# Patient Record
Sex: MALE | Race: WHITE | NOT HISPANIC OR LATINO | Employment: PART TIME | URBAN - METROPOLITAN AREA
[De-identification: names, ages, dates, MRNs, and addresses within clinical notes are randomized per-mention and may not be internally consistent; named-entity substitution may affect disease eponyms.]

---

## 2020-08-06 ENCOUNTER — OFFICE VISIT (OUTPATIENT)
Dept: URGENT CARE | Facility: CLINIC | Age: 20
End: 2020-08-06
Payer: COMMERCIAL

## 2020-08-06 ENCOUNTER — APPOINTMENT (OUTPATIENT)
Dept: RADIOLOGY | Facility: CLINIC | Age: 20
End: 2020-08-06
Payer: COMMERCIAL

## 2020-08-06 VITALS
DIASTOLIC BLOOD PRESSURE: 90 MMHG | BODY MASS INDEX: 31.36 KG/M2 | HEART RATE: 69 BPM | TEMPERATURE: 98.5 F | RESPIRATION RATE: 16 BRPM | SYSTOLIC BLOOD PRESSURE: 130 MMHG | HEIGHT: 73 IN | OXYGEN SATURATION: 99 % | WEIGHT: 236.6 LBS

## 2020-08-06 DIAGNOSIS — S69.92XA INJURY, THUMB, LEFT, INITIAL ENCOUNTER: Primary | ICD-10-CM

## 2020-08-06 DIAGNOSIS — S69.92XA INJURY, THUMB, LEFT, INITIAL ENCOUNTER: ICD-10-CM
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
## 2020-08-06 PROCEDURE — 73140 X-RAY EXAM OF FINGER(S): CPT

## 2020-08-06 PROCEDURE — G0382 LEV 3 HOSP TYPE B ED VISIT: HCPCS | Performed by: PHYSICIAN ASSISTANT

## 2020-08-06 PROCEDURE — 99283 EMERGENCY DEPT VISIT LOW MDM: CPT | Performed by: PHYSICIAN ASSISTANT

## 2020-08-06 NOTE — PROGRESS NOTES
NAME: Martha Rincon is a 23 y o  male  : 2000    MRN: [id number]      Assessment and Plan   Injury, thumb, left, initial encounter [S69 [id number]]  1  Injury, thumb, left, initial encounter  XR thumb left first digit-min 2v   2  Fall, initial encounter     X-ray ordered to rule out fracture  X-ray showed no evidence of fracture  Advised patient to take OTC ibuprofen for anti-inflammatory affects  Ace wrap applied  Use ice 2 to 3 times daily  If symptoms persist the next 3-5 days Pt should follow-up with Ortho  If >102 fever, severe hand, thumb pain, N/V, SOB, CP go to ER  Patient understands and agrees with treatment plans  Jelani Lozano was seen today for thumb injury  Diagnoses and all orders for this visit:    Injury, thumb, left, initial encounter  -     XR thumb left first digit-min 2v; Future    Fall, initial encounter        Patient Instructions   There are no Patient Instructions on file for this visit  Proceed to ER if symptoms worsen  Chief Complaint     Chief Complaint   Patient presents with    Thumb Injury     left, yesterday pt fell landing on thumb bending thumb into palm; c/o pain in base of thumb          History of Present Illness     20yo Pt presents with left thumb injury status post fall x 1 day  Patient reports falling yesterday landing on his left hand with thumb bent  Patient rates pain left thumb 5/10  Has used ice   Has  taken OTC Ibuprofen  Denies ecchymoses, swelling, open wound, bleeding  Denies numbness, tingling, weakness  Denies pain in wrists, elbow, shoulder  Denies head injury or LOC  Denies other injuries from the incident  Denies previous injuries to  Hand  Review of Systems   Review of Systems   Constitutional: Negative  Respiratory: Negative  Cardiovascular: Negative  Musculoskeletal: Positive for myalgias (Left thumb)  Current Medications     No current outpatient medications on file      Current Allergies     Allergies as of 08/06/2020    (No Known Allergies)              Past Medical History:   Diagnosis Date    Asthma     childhood       Past Surgical History:   Procedure Laterality Date    MOUTH SURGERY         No family history on file  Medications have been verified  The following portions of the patient's history were reviewed and updated as appropriate: allergies, current medications, past family history, past medical history, past social history, past surgical history and problem list     Objective   /90   Pulse 69   Temp 98 5 °F (36 9 °C) (Tympanic)   Resp 16   Ht 6' 1" (1 854 m)   Wt 107 kg (236 lb 9 6 oz)   SpO2 99%   BMI 31 22 kg/m²      Physical Exam     Physical Exam  Vitals signs and nursing note reviewed  Constitutional:       Appearance: Normal appearance  Cardiovascular:      Rate and Rhythm: Normal rate and regular rhythm  Heart sounds: Normal heart sounds  No murmur  No friction rub  No gallop  Pulmonary:      Effort: Pulmonary effort is normal  No respiratory distress  Breath sounds: Normal breath sounds  No stridor  No wheezing, rhonchi or rales  Chest:      Chest wall: No tenderness  Musculoskeletal:      Left hand: He exhibits tenderness (Left thumb)  He exhibits normal range of motion, no bony tenderness, normal two-point discrimination, normal capillary refill, no deformity, no laceration and no swelling  Normal sensation noted  Normal strength noted          Hands:          Sarah Borges PA-C

## 2021-07-29 ENCOUNTER — TELEPHONE (OUTPATIENT)
Dept: OBGYN CLINIC | Facility: HOSPITAL | Age: 21
End: 2021-07-29

## 2021-07-29 ENCOUNTER — HOSPITAL ENCOUNTER (OUTPATIENT)
Dept: RADIOLOGY | Facility: HOSPITAL | Age: 21
Discharge: HOME | End: 2021-07-29
Payer: COMMERCIAL

## 2021-07-29 ENCOUNTER — OFFICE VISIT (OUTPATIENT)
Dept: OBGYN CLINIC | Facility: HOSPITAL | Age: 21
End: 2021-07-29
Payer: COMMERCIAL

## 2021-07-29 DIAGNOSIS — M25.571 PAIN IN JOINT INVOLVING RIGHT ANKLE AND FOOT: ICD-10-CM

## 2021-07-29 DIAGNOSIS — S93.602A SPRAIN OF LEFT FOOT, INITIAL ENCOUNTER: ICD-10-CM

## 2021-07-29 DIAGNOSIS — M25.572 LEFT ANKLE PAIN, UNSPECIFIED CHRONICITY: ICD-10-CM

## 2021-07-29 DIAGNOSIS — M25.572 LEFT ANKLE PAIN, UNSPECIFIED CHRONICITY: Primary | ICD-10-CM

## 2021-07-29 PROCEDURE — 99203 OFFICE O/P NEW LOW 30 MIN: CPT | Performed by: PHYSICIAN ASSISTANT

## 2021-07-29 PROCEDURE — 73610 X-RAY EXAM OF ANKLE: CPT

## 2021-07-29 PROCEDURE — 73630 X-RAY EXAM OF FOOT: CPT

## 2021-07-29 NOTE — PROGRESS NOTES
Assessment/Plan   Diagnoses and all orders for this visit:      Pain in joint involving right ankle and foot  Midfoot sprain  - Concern for lisfranc fx  - CAM boot fitted and dispensed  - Ice, NSAIDs as needed  - Use the crutches until walking without a limp  - CT scan  - Follow up with Dr Abigail Cortez or Dr Joss Quinn after CT        Subjective   Patient ID: Olayinka Feliz is a 21 y o  male  Vitals:     Stacie Reddy male comes in for an evaluation of his left foot and ankle  He was injured 7-26-21 when he twisted and fell while playing [employer] (a tag-like game)  at a summer camp  He twisted, fell, and then a large lid fell on top of him  He went to an urgent care and was treated with a posterior splint  The pain is sharp in character, moderate in severity, pain does not radiate and is not associated with numbness  The pain is located in the dorsal midfoot  The following portions of the patient's history were reviewed and updated as appropriate: allergies, current medications, past family history, past medical history, past social history, past surgical history and problem list     Review of Systems  Ortho Exam  History reviewed  No pertinent past medical history  Past Surgical History:   Procedure Laterality Date    DENTAL SURGERY       History reviewed  No pertinent family history  Social History     Occupational History    Not on file   Tobacco Use    Smoking status: Never Smoker    Smokeless tobacco: Never Used   Vaping Use    Vaping Use: Never used   Substance and Sexual Activity    Alcohol use: Not on file    Drug use: Not on file    Sexual activity: Not on file       Review of Systems   Constitutional: Negative  HENT: Negative  Eyes: Negative  Respiratory: Negative  Cardiovascular: Negative  Gastrointestinal: Negative  Endocrine: Negative  Genitourinary: Negative  Musculoskeletal: As below      Allergic/Immunologic: Negative  Neurological: Negative      Hematological: Negative  Psychiatric/Behavioral: Negative  Objective   Physical Exam        I have personally reviewed pertinent films in PACS and my interpretation is concern for lisfranc injury, cuneiform fracture  · Constitutional: Awake, Alert, Oriented  · Eyes: EOMI  · Psych: Mood and affect appropriate  · Heart: regular rate and rhythm  · Lungs: No audible wheezing  · Abdomen: soft  · Lymph: no lymphedema             left ankle, foot:  - Appearance   Swelling: mild, no discoloration, no deformity, no ecchymosis and no erythema  - Palpation   + tenderness over the dorsal midfoot    Non-tender malleoli, achilles, proximal tib/fib, forefoot   - ROM   No active motion in the foot / ankle due to pain  - Special Tests      - Motor   limited by pain  - NVI distally

## 2021-07-29 NOTE — TELEPHONE ENCOUNTER
Patient's mom is calling stating that this is not [facility] for you & it is the regular [facility]   Patient's mom works at [employer]

## 2021-07-29 NOTE — TELEPHONE ENCOUNTER
LM giving patient directions to our office and asking what [facility] he has  If he has [address] we are not par with this  Any other [facility] should be OK

## 2021-08-03 ENCOUNTER — HOSPITAL ENCOUNTER (OUTPATIENT)
Dept: CT IMAGING | Facility: HOSPITAL | Age: 21
Discharge: HOME | End: 2021-08-03
Payer: COMMERCIAL

## 2021-08-03 DIAGNOSIS — M25.571 PAIN IN JOINT INVOLVING RIGHT ANKLE AND FOOT: ICD-10-CM

## 2021-08-03 DIAGNOSIS — M25.572 LEFT ANKLE PAIN, UNSPECIFIED CHRONICITY: ICD-10-CM

## 2021-08-03 PROCEDURE — 73700 CT LOWER EXTREMITY W/O DYE: CPT

## 2021-08-05 ENCOUNTER — OFFICE VISIT (OUTPATIENT)
Dept: OBGYN CLINIC | Facility: CLINIC | Age: 21
End: 2021-08-05
Payer: COMMERCIAL

## 2021-08-05 VITALS
WEIGHT: 235 LBS | DIASTOLIC BLOOD PRESSURE: 68 MMHG | SYSTOLIC BLOOD PRESSURE: 112 MMHG | BODY MASS INDEX: 31.14 KG/M2 | HEIGHT: 73 IN

## 2021-08-05 DIAGNOSIS — S93.325A LISFRANC DISLOCATION, LEFT, INITIAL ENCOUNTER: Primary | ICD-10-CM

## 2021-08-05 DIAGNOSIS — S93.602A SPRAIN OF LEFT FOOT, INITIAL ENCOUNTER: ICD-10-CM

## 2021-08-05 PROCEDURE — 99213 OFFICE O/P EST LOW 20 MIN: CPT | Performed by: ORTHOPAEDIC SURGERY

## 2021-08-05 NOTE — PATIENT INSTRUCTIONS
Lisfranc Injury     What is a Lisfranc injury? A Lisfranc injury involves the joints and/or the ligaments of the middle of the foot  This injury can result from a major accident or a simple slip and fall  Sometimes this injury can be mistaken for a sprain, and not obtaining treatment can sometimes lead to more significant problems  The degree of injury can range from mild to severe  What are the symptoms of a Lisfranc injury? The common symptoms of a Lisfranc injury are swelling and pain on the top of the foot  Bruising is common, and bruising on the bottom of the foot is a clue that this injury has occurred  With a severe injury, the foot may be distorted and putting any weight on it may be very painful  With a mild injury, the foot may appear normal and you may be able to put weight on it with only mild pain  What causes a Lisfranc injury? This injury may be low-energy and result from a slip and fall  Twisting frequently occurs during athletic injuries but can also occur from a misstep or even missing a stair and stumbling over the top of the foot  High-energy injuries occur from direct trauma such as a car accident or a fall from a height  ?   Anatomy   The midfoot area is comprised of the joints and ligaments in the middle of the foot  Specialized ligaments hold the bones in this area together like puzzle pieces to maintain the arch of the foot  When the ligaments and/or bones in this area are injured, they may shift out of place    How is a Lisfranc injury diagnosed? The diagnosis of a Lisfranc injury is made using several things: your symptoms, a foot exam, X-rays and other tests   Your orthopaedic foot and ankle specialist will examine the middle part of your foot to identify the location of pain and perform tests to check the stability of this area  X-rays may show broken and or shifted bones in the middle of the foot  Sometimes X-rays will be taken while you are standing in order to better identify the shifting of bones in the foot  An MRI scan may be helpful to see if the ligaments in the foot are damaged  A CT scan can help determine the extent of the bone injury and is useful when planning surgery    What are treatment options? If the ligaments and the bones in the middle of the foot are not severely injured, and bones are not shifted out of their normal positions, non-surgical treatment can be successful with casting  A cast may be needed for six weeks  Your orthopaedic foot and ankle specialist will follow up regularly with X-rays to make sure the bones maintain their good position during the recovery  If the bones or ligaments are injured in a way that causes them to shift out of their normal positions, Lisfranc surgery may be necessary to restore the anatomy of the foot  Surgery may involve the placement of plates and screws that may be removed later, once the bones and ligaments have healed  How long is recovery after surgery? Recovery from Lisfranc surgery depends in part on the severity of the injury  For most cases of surgery, patients will be in a cast and not be able to put weight on the foot for six weeks, followed by six weeks in a walking boot  Physical therapy may be needed to strengthen the foot and ankle and help regain walking ability  Return to maximal function, running and sports can take about one year    Potential Complications    ? Lisfranc injuries may result in arthritis and chronic pain in the middle of the foot  This may require additional treatment  With surgery, injury to the nerves and tendons may occur  Because of the swelling that often occurs with this injury, complications such as wound opening, infection and/or further swelling of the foot may occur after surgery  ?  The outcome for Lisfranc injuries depends on their severity   Some patients will not be able to return to their pre-injury level of functioning or athletics even with well-performed treatment  The cartilage joint surfaces are commonly injured and some patients may develop arthritis of the middle of the foot  It is also common for pain to continue in the joints after this injury  For some patients, surgery such a fusion of the joints may be necessary to relieve arthritis pain      Frequently Asked Questions    How can I tell if it's a sprain or a Lisfranc injury? Unrecognized and untreated Lisfranc injuries can have serious complications, including joint degeneration and a buildup of pressure within muscles that can damage nerves and blood vessels  If the standard treatment for a sprain (rest, ice and elevation) doesn't reduce the pain and swelling within a day or two, or there is extensive bruising on the bottom of the foot, see your doctor immediately and ask for a referral to an orthopaedic foot and ankle specialist     How soon can I get back to normal activity? It is important to follow your doctor's orders and refrain from activities until you are given the go-ahead  If you return to activities too soon after a Lisfranc injury or surgery, you may suffer another injury that results in damage to blood vessels, development of arthritis and/or an even longer healing time

## 2021-08-05 NOTE — PROGRESS NOTES
YELENA Felipe  Attending, Orthopaedic Surgery  [facility] [address] [facility]      ORTHOPAEDIC FOOT AND ANKLE CLINIC VISIT     Assessment:     Encounter Diagnoses   Name Primary?  Sprain of left foot, initial encounter     Lisfranc dislocation, left, initial encounter Yes            Plan:   · The patient verbalized understanding of exam findings and treatment plan  We engaged in the shared decision-making process and treatment options were discussed at length with the patient  Surgical and conservative management discussed today along with risks and benefits  · He has pain right over his lisfranc ligament complex  A CT scan was obtained although MRI is 100% sensitive for ligamentus injury and would have been the imaging study of choice in this situation  · MRI left foot ordered today STAT  See back in 1 week for review of MRI      History of Present Illness:   Chief Complaint: left foot pain     Jodie Ren is a 21 y o  male who is being seen for left foot pain  Patient was seen in urgent care on 7/29/21, after a twisting injury on 7/26/21 and fell while playing sharks and minnows at a summer camp  He had a year old fall directly onto his foot after her fall  He was treated with a posterior splint and crutches  CT scan was ordered to rule out possible lisfranc injury and placed in a cam boot  Pain is localized at lisfranc region with minimal radiating and described as sharp and severe  Patient denies numbness, tingling or radicular pain  Denies history of neuropathy  Patient does not smoke, does not have diabetes and does notno take blood thinners  Patient denies family history of anesthesia complications and has not had any complications with anesthesia       Pain/symptom timing:  Worse during the day when active  Pain/symptom context:  Worse with activites and work  Pain/symptom modifying factors:  Rest makes better, activities make worse  Pain/symptom associated signs/symptoms: none    Prior treatment   · NSAIDsYes   · Injections No   · Bracing/Orthotics Yes  Posterior splint and cam boot  · Physical Therapy No     Orthopedic Surgical History:   See below    Past Medical, Surgical and Social History:  Past Medical History:  has a past medical history of Asthma  Problem List: does not have a problem list on file  Past Surgical History:  has a past surgical history that includes Mouth surgery and Dental surgery  Family History: family history is not on file  Social History:  reports that he has never smoked  He has never used smokeless tobacco  He reports that he does not drink alcohol and does not use drugs  Current Medications: currently has no medications in their medication list   Allergies: has No Known Allergies  Review of Systems:  General- denies fever/chills  HEENT- denies hearing loss or sore throat  Eyes- denies eye pain or visual disturbances, denies red eyes  Respiratory- denies cough or SOB  Cardio- denies chest pain or palpitations  GI- denies abdominal pain  Endocrine- denies urinary frequency  Urinary- denies pain with urination  Musculoskeletal- Negative except noted above  Skin- denies rashes or wounds  Neurological- denies dizziness or headache  Psychiatric- denies anxiety or difficulty concentrating    Physical Exam:   There were no vitals taken for this visit  General/Constitutional: No apparent distress: well-nourished and well developed  Eyes: normal ocular motion  Cardio: RRR, Normal S1S2, No m/r/g  Lymphatic: No appreciable lymphadenopathy  Respiratory: Non-labored breathing, CTA b/l no w/c/r  Vascular: No edema, swelling or tenderness, except as noted in detailed exam   Integumentary: No impressive skin lesions present, except as noted in detailed exam   Neuro: No ataxia or tremors noted  Psych: Normal mood and affect, oriented to person, place and time  Appropriate affect    Musculoskeletal: Normal, except as noted in detailed exam and in HPI     Examination    Left    Gait Antalgic   Musculoskeletal Tender to palpation at lisfranc region    Skin Normal       Nails Normal    Range of Motion  10 degrees dorsiflexion, 30 degrees plantarflexion  Subtalar motion: normal    Stability Stable    Muscle Strength 5/5 tibialis anterior  5/5 gastrocnemius-soleus  5/5 posterior tibialis  5/5 peroneal/eversion strength  5/5 EHL  5/5 FHL    Neurologic Normal    Sensation Intact to light touch throughout sural, saphenous, superficial peroneal, deep peroneal and medial/lateral plantar nerve distributions  [place] 5 07 filament (10g) testing  deferred  Cardiovascular Brisk capillary refill < 2 seconds,intact DP and PT pulses    Special Tests None      Imaging Studies:   CT scan of the left lower extremity was obtained, which demonstrates no evidence of fracture at the region of the lisfranc ligaments  Reviewed by me personally  Soyla Hoit Lachman, MD  Foot & Ankle Surgery   Department of [address] [place] [address]      I personally performed the service  Soyla Hoit Lachman, MD    Scribe Attestation    I,:   am acting as a scribe while in the presence of the attending physician :       I,:   personally performed the services described in this documentation    as scribed in my presence :

## 2021-08-07 ENCOUNTER — HOSPITAL ENCOUNTER (OUTPATIENT)
Dept: MRI IMAGING | Facility: HOSPITAL | Age: 21
Discharge: HOME | End: 2021-08-07
Attending: ORTHOPAEDIC SURGERY
Payer: COMMERCIAL

## 2021-08-07 DIAGNOSIS — S93.325A LISFRANC DISLOCATION, LEFT, INITIAL ENCOUNTER: ICD-10-CM

## 2021-08-07 PROCEDURE — 73718 MRI LOWER EXTREMITY W/O DYE: CPT

## 2021-08-09 ENCOUNTER — TELEPHONE (OUTPATIENT)
Dept: OBGYN CLINIC | Facility: HOSPITAL | Age: 21
End: 2021-08-09

## 2021-08-09 NOTE — TELEPHONE ENCOUNTER
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

## 2021-08-11 NOTE — TELEPHONE ENCOUNTER
Called and spoke to mother  It does not look like he will need surgery based on MRI but Dr Stevo Nicolas will discuss treatment moving forward at his follow up appointment tomorrow

## 2021-08-12 ENCOUNTER — OFFICE VISIT (OUTPATIENT)
Dept: OBGYN CLINIC | Facility: CLINIC | Age: 21
End: 2021-08-12
Payer: COMMERCIAL

## 2021-08-12 VITALS
DIASTOLIC BLOOD PRESSURE: 62 MMHG | WEIGHT: 235 LBS | HEIGHT: 73 IN | BODY MASS INDEX: 31.14 KG/M2 | SYSTOLIC BLOOD PRESSURE: 117 MMHG

## 2021-08-12 DIAGNOSIS — S93.602A SPRAIN OF LEFT FOOT, INITIAL ENCOUNTER: Primary | ICD-10-CM

## 2021-08-12 PROCEDURE — 99213 OFFICE O/P EST LOW 20 MIN: CPT | Performed by: ORTHOPAEDIC SURGERY

## 2021-08-12 NOTE — PROGRESS NOTES
YELENA Waite  Attending, Orthopaedic Surgery  [facility] [address] [facility]      ORTHOPAEDIC FOOT AND ANKLE CLINIC VISIT     Assessment:     Encounter Diagnosis   Name Primary?  Sprain of left foot, initial encounter Yes            Plan:   · The patient verbalized understanding of exam findings and treatment plan  We engaged in the shared decision-making process and treatment options were discussed at length with the patient  Surgical and conservative management discussed today along with risks and benefits  · MRI demonstrates sprain of the inner osseus membrane  No evidence of Lisfranc injury  · Wean the boot protocol was provided to begin today  · PT script was provided  · Continue to ice and elevated as needed  · If he has no pain, then he may cancel the appointment  History of Present Illness:   Chief Complaint: left foot follow up     Micheline Eagle is a 21 y o  male who is being seen in follow-up for left foot pain  When we last saw he we recommended a STAT MRI to rule out Lisfranc injury  Advised to remain in the cam boot and WBAT  Pain has  improved  Residual pain is localized at lisfranc with minimal radiating and described as sharp and severe  Pain/symptom timing:  Worse during the day when active  Pain/symptom context:  Worse with activites and work  Pain/symptom modifying factors:  Rest makes better, activities make worse  Pain/symptom associated signs/symptoms: none    Prior treatment   · NSAIDsYes   · Injections No   · Bracing/Orthotics Yes    · Physical Therapy No     Orthopedic Surgical History:   See Below    Past Medical, Surgical and Social History:  Past Medical History:  has a past medical history of Asthma  Problem List: does not have a problem list on file  Past Surgical History:  has a past surgical history that includes Mouth surgery and Dental surgery  Family History: family history is not on file    Social History:  reports that he has never smoked  He has never used smokeless tobacco  He reports that he does not drink alcohol and does not use drugs  Current Medications: currently has no medications in their medication list   Allergies: has No Known Allergies  Review of Systems:  General- denies fever/chills  HEENT- denies hearing loss or sore throat  Eyes- denies eye pain or visual disturbances, denies red eyes  Respiratory- denies cough or SOB  Cardio- denies chest pain or palpitations  GI- denies abdominal pain  Endocrine- denies urinary frequency  Urinary- denies pain with urination  Musculoskeletal- Negative except noted above  Skin- denies rashes or wounds  Neurological- denies dizziness or headache  Psychiatric- denies anxiety or difficulty concentrating    Physical Exam:   /62   Ht 6' 1" (1 854 m)   Wt 107 kg (235 lb)   BMI 31 00 kg/m²   General/Constitutional: No apparent distress: well-nourished and well developed  Eyes: normal ocular motion  Lymphatic: No appreciable lymphadenopathy  Respiratory: Non-labored breathing  Vascular: No edema, swelling or tenderness, except as noted in detailed exam   Integumentary: No impressive skin lesions present, except as noted in detailed exam   Neuro: No ataxia or tremors noted  Psych: Normal mood and affect, oriented to person, place and time  Appropriate affect  Musculoskeletal: Normal, except as noted in detailed exam and in HPI      Examination    Left    Gait Normal   Musculoskeletal Tender to palpation at lisfrance    Skin Normal      Nails Normal    Range of Motion  Unable to assess degrees dorsiflexion, unable to assess degrees plantarflexion  Subtalar motion: unable to assess    Stability Stable    Muscle Strength 5/5 tibialis anterior  5/5 gastrocnemius-soleus  5/5 posterior tibialis  5/5 peroneal/eversion strength  5/5 EHL  5/5 FHL    Neurologic Normal    Sensation  Intact to light touch throughout sural, saphenous, superficial peroneal, deep peroneal and medial/lateral plantar nerve distributions  [place] 5 07 filament (10g) testing  deferred  Cardiovascular Brisk capillary refill < 2 seconds,intact DP and PT pulses    Special Tests None      Imaging Studies:   MRI available for review of left foot which demonstrates no lisfranc compromise  Intraosseus component sprain  Reviewed by me personally  Esmeralda Distel Lachman, MD  Foot & Ankle Surgery   Department of [address] [place] [address]      I personally performed the service  Esmeralda Distel Lachman, MD    Scribe Attestation    I,:   am acting as a scribe while in the presence of the attending physician :       I,:   personally performed the services described in this documentation    as scribed in my presence :

## 2021-10-15 ENCOUNTER — OFFICE VISIT (OUTPATIENT)
Dept: URGENT CARE | Facility: CLINIC | Age: 21
End: 2021-10-15
Payer: COMMERCIAL

## 2021-10-15 ENCOUNTER — APPOINTMENT (OUTPATIENT)
Dept: RADIOLOGY | Facility: CLINIC | Age: 21
End: 2021-10-15
Payer: COMMERCIAL

## 2021-10-15 VITALS
BODY MASS INDEX: 38.7 KG/M2 | HEIGHT: 73 IN | OXYGEN SATURATION: 97 % | SYSTOLIC BLOOD PRESSURE: 137 MMHG | HEART RATE: 87 BPM | RESPIRATION RATE: 20 BRPM | DIASTOLIC BLOOD PRESSURE: 86 MMHG | WEIGHT: 292 LBS | TEMPERATURE: 96.1 F

## 2021-10-15 DIAGNOSIS — S93.492A SPRAIN OF ANTERIOR TALOFIBULAR LIGAMENT OF LEFT ANKLE, INITIAL ENCOUNTER: Primary | ICD-10-CM

## 2021-10-15 DIAGNOSIS — M25.572 ACUTE LEFT ANKLE PAIN: ICD-10-CM

## 2021-10-15 PROCEDURE — 73610 X-RAY EXAM OF ANKLE: CPT

## 2021-10-15 PROCEDURE — 99213 OFFICE O/P EST LOW 20 MIN: CPT | Performed by: FAMILY MEDICINE

## 2021-10-15 PROCEDURE — S9083 URGENT CARE CENTER GLOBAL: HCPCS | Performed by: FAMILY MEDICINE

## 2021-10-15 RX ORDER — MELOXICAM 7.5 MG/1
7.5 TABLET ORAL DAILY
Qty: 60 TABLET | Refills: 0 | Status: SHIPPED | OUTPATIENT
Start: 2021-10-15

## 2022-03-27 ENCOUNTER — APPOINTMENT (OUTPATIENT)
Dept: RADIOLOGY | Facility: CLINIC | Age: 22
End: 2022-03-27
Payer: COMMERCIAL

## 2022-03-27 ENCOUNTER — OFFICE VISIT (OUTPATIENT)
Dept: URGENT CARE | Facility: CLINIC | Age: 22
End: 2022-03-27
Payer: COMMERCIAL

## 2022-03-27 VITALS
TEMPERATURE: 97.4 F | WEIGHT: 290 LBS | HEART RATE: 86 BPM | HEIGHT: 73 IN | BODY MASS INDEX: 38.43 KG/M2 | OXYGEN SATURATION: 100 % | DIASTOLIC BLOOD PRESSURE: 92 MMHG | RESPIRATION RATE: 18 BRPM | SYSTOLIC BLOOD PRESSURE: 137 MMHG

## 2022-03-27 DIAGNOSIS — S93.402A SPRAIN OF LEFT ANKLE, UNSPECIFIED LIGAMENT, INITIAL ENCOUNTER: Primary | ICD-10-CM

## 2022-03-27 DIAGNOSIS — S99.912A INJURY OF LEFT ANKLE, INITIAL ENCOUNTER: ICD-10-CM

## 2022-03-27 PROCEDURE — S9083 URGENT CARE CENTER GLOBAL: HCPCS | Performed by: PHYSICIAN ASSISTANT

## 2022-03-27 PROCEDURE — 73610 X-RAY EXAM OF ANKLE: CPT

## 2022-03-27 PROCEDURE — 99213 OFFICE O/P EST LOW 20 MIN: CPT | Performed by: PHYSICIAN ASSISTANT

## 2022-03-27 RX ORDER — IBUPROFEN 600 MG/1
600 TABLET ORAL EVERY 6 HOURS PRN
Qty: 30 TABLET | Refills: 0 | Status: SHIPPED | OUTPATIENT
Start: 2022-03-27

## 2022-03-27 NOTE — PATIENT INSTRUCTIONS
Ankle Sprain   WHAT YOU NEED TO KNOW:   An ankle sprain happens when 1 or more ligaments in your ankle joint stretch or tear  Ligaments are tough tissues that connect bones  Ligaments support your joints and keep your bones in place  DISCHARGE INSTRUCTIONS:   Return to the emergency department if:   · You have severe pain in your ankle  · Your foot or toes are cold or numb  · Your ankle becomes more weak or unstable (wobbly)  · You are unable to put any weight on your ankle or foot  · Your swelling has increased or returned  Call your doctor if:   · Your pain does not go away, even after treatment  · You have questions or concerns about your condition or care  Medicines: You may need any of the following:  · NSAIDs , such as ibuprofen, help decrease swelling, pain, and fever  This medicine is available with or without a doctor's order  NSAIDs can cause stomach bleeding or kidney problems in certain people  If you take blood thinner medicine, always ask your healthcare provider if NSAIDs are safe for you  Always read the medicine label and follow directions  · Acetaminophen  decreases pain and fever  It is available without a doctor's order  Ask how much to take and how often to take it  Follow directions  Read the labels of all other medicines you are using to see if they also contain acetaminophen, or ask your doctor or pharmacist  Acetaminophen can cause liver damage if not taken correctly  Do not use more than 4 grams (4,000 milligrams) total of acetaminophen in one day  · Prescription pain medicine  may be given  Ask your healthcare provider how to take this medicine safely  Some prescription pain medicines contain acetaminophen  Do not take other medicines that contain acetaminophen without talking to your healthcare provider  Too much acetaminophen may cause liver damage  Prescription pain medicine may cause constipation   Ask your healthcare provider how to prevent or treat constipation  · Take your medicine as directed  Contact your healthcare provider if you think your medicine is not helping or if you have side effects  Tell him or her if you are allergic to any medicine  Keep a list of the medicines, vitamins, and herbs you take  Include the amounts, and when and why you take them  Bring the list or the pill bottles to follow-up visits  Carry your medicine list with you in case of an emergency  Self-care:   · Use support devices,  such as a brace, cast, or splint, to limit your movement and protect your joint  You may need to use crutches to decrease your pain as you move around  · Go to physical therapy as directed  A physical therapist teaches you exercises to help improve movement and strength, and to decrease pain  · Rest  your ankle so that it can heal  Return to normal activities as directed  · Apply ice  on your ankle for 15 to 20 minutes every hour or as directed  Use an ice pack, or put crushed ice in a plastic bag  Cover it with a towel  Ice helps prevent tissue damage and decreases swelling and pain  · Compress  your ankle  Ask if you should wrap an elastic bandage around your injured ligament  An elastic bandage provides support and helps decrease swelling and movement so your joint can heal  Wear as long as directed  · Elevate  your ankle above the level of your heart as often as you can  This will help decrease swelling and pain  Prop your ankle on pillows or blankets to keep it elevated comfortably  Prevent another ankle sprain:   · Let your ankle heal   Find out how long your ligament needs to heal  Do not do any physical activity until your healthcare provider says it is okay  If you start activity too soon, you may develop a more serious injury  · Always warm up and stretch  before you exercise or play sports  · Use the right equipment  Always wear shoes that fit well and are made for the activity that you are doing   You may also need ankle supports, elbow and knee pads, or braces  Follow up with your doctor as directed:  Write down your questions so you remember to ask them during your visits  © Copyright [employer] 2022 Information is for End User's use only and may not be sold, redistributed or otherwise used for commercial purposes  All illustrations and images included in CareNotes® are the copyrighted property of A D A M [employer] Inc  or [facility] Laci [employer]   The above information is an  only  It is not intended as medical advice for individual conditions or treatments  Talk to your doctor, nurse or pharmacist before following any medical regimen to see if it is safe and effective for you

## 2022-03-27 NOTE — PROGRESS NOTES
[facility]        NAME: Mallory Altamirano is a 24 y o  male  : 2000    MRN: [id number]  DATE: 2022  TIME: 1:30 PM    /92   Pulse 86   Temp (!) 97 4 °F (36 3 °C)   Resp 18   Ht 6' 1" (1 854 m)   Wt 132 kg (290 lb)   SpO2 100%   BMI 38 26 kg/m²     Assessment and Plan   Sprain of left ankle, unspecified ligament, initial encounter [S93 402A]  1  Sprain of left ankle, unspecified ligament, initial encounter  XR ankle 3+ vw left    Compression bandages    ibuprofen (MOTRIN) 600 mg tablet    Ambulatory referral to Orthopedic Surgery         Patient Instructions       Follow up with PCP in 3-5 days  Proceed to  ER if symptoms worsen  Chief Complaint     Chief Complaint   Patient presents with    Ankle Pain     twsited left ankle 3 x 1 week ago while on a hiking trail, pain is on left lateral and medial ankle, sharp pain in nature         History of Present Illness       Pt with left ankle pain since inverting 3 x in 1 days several days ago       Review of Systems   Review of Systems   Constitutional: Negative  HENT: Negative  Eyes: Negative  Respiratory: Negative  Cardiovascular: Negative  Gastrointestinal: Negative  Endocrine: Negative  Genitourinary: Negative  Musculoskeletal: Negative  Skin: Negative  Allergic/Immunologic: Negative  Neurological: Negative  Hematological: Negative  Psychiatric/Behavioral: Negative  All other systems reviewed and are negative          Current Medications       Current Outpatient Medications:     ibuprofen (MOTRIN) 600 mg tablet, Take 1 tablet (600 mg total) by mouth every 6 (six) hours as needed for mild pain, Disp: 30 tablet, Rfl: 0    meloxicam (MOBIC) 7 5 mg tablet, Take 1 tablet (7 5 mg total) by mouth daily (Patient not taking: Reported on 3/27/2022 ), Disp: 60 tablet, Rfl: 0    Current Allergies     Allergies as of 2022    (No Known Allergies)            The following portions of the patient's history were reviewed and updated as appropriate: allergies, current medications, past family history, past medical history, past social history, past surgical history and problem list      Past Medical History:   Diagnosis Date    Asthma     childhood       Past Surgical History:   Procedure Laterality Date   [address]          History reviewed  No pertinent family history  Medications have been verified  Objective   /92   Pulse 86   Temp (!) 97 4 °F (36 3 °C)   Resp 18   Ht 6' 1" (1 854 m)   Wt 132 kg (290 lb)   SpO2 100%   BMI 38 26 kg/m²        Physical Exam     Physical Exam  Vitals and nursing note reviewed  Constitutional:       Appearance: Normal appearance  He is normal weight  HENT:      Head: Normocephalic and atraumatic  Right Ear: Tympanic membrane, ear canal and external ear normal       Left Ear: Tympanic membrane, ear canal and external ear normal       Nose: Nose normal       Mouth/Throat:      Mouth: Mucous membranes are moist       Pharynx: Oropharynx is clear  Eyes:      Extraocular Movements: Extraocular movements intact  Conjunctiva/sclera: Conjunctivae normal       Pupils: Pupils are equal, round, and reactive to light  Cardiovascular:      Rate and Rhythm: Normal rate and regular rhythm  Pulses: Normal pulses  Heart sounds: Normal heart sounds  Pulmonary:      Effort: Pulmonary effort is normal       Breath sounds: Normal breath sounds  Abdominal:      General: Abdomen is flat  Bowel sounds are normal       Palpations: Abdomen is soft  Musculoskeletal:         General: Normal range of motion  Cervical back: Normal range of motion and neck supple  Comments: Left ankle from lateral malleolus tender and swelling  No achilles tender no heel pain  No foot pain toes from dp pulse strong    Skin:     General: Skin is warm  Capillary Refill: Capillary refill takes less than 2 seconds  Neurological:      General: No focal deficit present  Mental Status: He is alert and oriented to person, place, and time     Psychiatric:         Mood and Affect: Mood normal          Behavior: Behavior normal

## 2022-04-25 ENCOUNTER — HOSPITAL ENCOUNTER (EMERGENCY)
Facility: HOSPITAL | Age: 22
Discharge: HOME | End: 2022-04-25
Attending: EMERGENCY MEDICINE | Admitting: EMERGENCY MEDICINE
Payer: COMMERCIAL

## 2022-04-25 ENCOUNTER — APPOINTMENT (EMERGENCY)
Dept: CT IMAGING | Facility: HOSPITAL | Age: 22
End: 2022-04-25
Payer: COMMERCIAL

## 2022-04-25 VITALS
TEMPERATURE: 98.5 F | RESPIRATION RATE: 20 BRPM | WEIGHT: 290 LBS | BODY MASS INDEX: 38.43 KG/M2 | SYSTOLIC BLOOD PRESSURE: 130 MMHG | HEIGHT: 73 IN | DIASTOLIC BLOOD PRESSURE: 82 MMHG | OXYGEN SATURATION: 99 % | HEART RATE: 85 BPM

## 2022-04-25 DIAGNOSIS — F07.81 POST CONCUSSIVE SYNDROME: ICD-10-CM

## 2022-04-25 DIAGNOSIS — S09.90XA HEAD INJURY: Primary | ICD-10-CM

## 2022-04-25 PROCEDURE — 70450 CT HEAD/BRAIN W/O DYE: CPT

## 2022-04-25 PROCEDURE — 99284 EMERGENCY DEPT VISIT MOD MDM: CPT | Performed by: PHYSICIAN ASSISTANT

## 2022-04-25 PROCEDURE — 96372 THER/PROPH/DIAG INJ SC/IM: CPT

## 2022-04-25 PROCEDURE — G1004 CDSM NDSC: HCPCS

## 2022-04-25 PROCEDURE — 99284 EMERGENCY DEPT VISIT MOD MDM: CPT

## 2022-04-25 RX ORDER — KETOROLAC TROMETHAMINE 30 MG/ML
15 INJECTION, SOLUTION INTRAMUSCULAR; INTRAVENOUS ONCE
Status: COMPLETED | OUTPATIENT
Start: 2022-04-25 | End: 2022-04-25

## 2022-04-25 RX ORDER — METHYLPREDNISOLONE 4 MG/1
TABLET ORAL
Qty: 21 TABLET | Refills: 0 | Status: SHIPPED | OUTPATIENT
Start: 2022-04-25

## 2022-04-25 RX ORDER — ONDANSETRON 4 MG/1
4 TABLET, ORALLY DISINTEGRATING ORAL ONCE
Status: COMPLETED | OUTPATIENT
Start: 2022-04-25 | End: 2022-04-25

## 2022-04-25 RX ADMIN — ONDANSETRON 4 MG: 4 TABLET, ORALLY DISINTEGRATING ORAL at 12:19

## 2022-04-25 RX ADMIN — KETOROLAC TROMETHAMINE 15 MG: 30 INJECTION, SOLUTION INTRAMUSCULAR at 12:18

## 2022-04-25 NOTE — Clinical Note
Moustapha Olson was seen and treated in our emergency department on 4/25/2022  Diagnosis:     Yong Moses  may return to school on return date  He may return on this date: 05/02/2022         If you have any questions or concerns, please don't hesitate to call        Amrita Montgomery PA-C    ______________________________           _______________          _______________  Hospital Representative                              Date                                Time

## 2022-04-25 NOTE — ED NOTES
Patient reports last taking tylenol/motrin for pain yesterday with no relief        Charleen Vizcarra RN  04/25/22 9149

## 2022-04-25 NOTE — DISCHARGE INSTRUCTIONS
Follow up with concussion clinic  Continue tylenol for headaches  Take medrol dose pack as directed  Return to ER if symptoms worsen

## 2022-04-25 NOTE — ED PROVIDER NOTES
History  Chief Complaint   Patient presents with    Head Injury     davidson presents to ED with on going concussion symptoms since hitting his head approximately four weeks ago  Patient has been seeing concussion specialist  Patient reports hitting head last thursday he hit his head a metal bar while working  Patient now reports headaches returning worse than usual       Patient I a 23 y/o M with h/o asthma that presents to the ED after a head injury x 2  Patient states he injured his head a couple weeks ago and was diagnosed with a concussion  He continued with headaches  He states he re-injured his head 4 days ago by hitting it on a bar  He denies LOC  He has nausea, dizziness, headaches  He has been taking tylenol and motrin for his headaches, but they continue  He denies weakness, numbness  History provided by:  Patient  Head Injury w/unknown LOC  Location:  Generalized  Mechanism of injury: direct blow    Pain details:     Quality:  Aching    Duration:  3 weeks    Timing:  Constant    Progression:  Worsening  Chronicity:  New  Relieved by:  Nothing  Worsened by:  Nothing  Ineffective treatments:  NSAIDs and OTC medications  Associated symptoms: headache and nausea    Associated symptoms: no blurred vision, no difficulty breathing, no disorientation, no double vision, no focal weakness, no loss of consciousness, no memory loss, no neck pain, no numbness, no seizures and no vomiting        Prior to Admission Medications   Prescriptions Last Dose Informant Patient Reported?  Taking?   ibuprofen (MOTRIN) 600 mg tablet   No No   Sig: Take 1 tablet (600 mg total) by mouth every 6 (six) hours as needed for mild pain   meloxicam (MOBIC) 7 5 mg tablet Not Taking at Unknown time  No No   Sig: Take 1 tablet (7 5 mg total) by mouth daily   Patient not taking: Reported on 3/27/2022       Facility-Administered Medications: None       Past Medical History:   Diagnosis Date    Asthma     childhood    Concussion Past Surgical History:   Procedure Laterality Date    DENTAL SURGERY      MOUTH SURGERY         History reviewed  No pertinent family history  I have reviewed and agree with the history as documented  E-Cigarette/Vaping    E-Cigarette Use Never User      E-Cigarette/Vaping Substances    Nicotine No     THC No     CBD No     Flavoring No     Other No     Unknown No      Social History     Tobacco Use    Smoking status: Never Smoker    Smokeless tobacco: Never Used   Vaping Use    Vaping Use: Never used   Substance Use Topics    Alcohol use: Never    Drug use: Never       Review of Systems   Constitutional: Negative for chills and fever  HENT: Negative  Eyes: Negative for blurred vision, double vision and visual disturbance  Respiratory: Negative for cough and shortness of breath  Cardiovascular: Negative for chest pain  Gastrointestinal: Positive for nausea  Negative for vomiting  Musculoskeletal: Negative for neck pain  Skin: Positive for wound (abrasion right parietal scalp)  Negative for color change  Neurological: Positive for dizziness and headaches  Negative for focal weakness, seizures, loss of consciousness, facial asymmetry, weakness, light-headedness and numbness  Psychiatric/Behavioral: Negative for confusion, decreased concentration and memory loss  All other systems reviewed and are negative  Physical Exam  Physical Exam  Vitals and nursing note reviewed  Constitutional:       General: He is not in acute distress  Appearance: Normal appearance  He is well-developed, well-groomed and overweight  He is not ill-appearing or diaphoretic  HENT:      Head: Normocephalic  Abrasion (right parietal scalp) present  Right Ear: Hearing and external ear normal       Left Ear: Hearing and external ear normal       Nose: Nose normal       Mouth/Throat:      Mouth: Mucous membranes are moist       Pharynx: Oropharynx is clear     Eyes:      General: No visual field deficit  Extraocular Movements: Extraocular movements intact  Conjunctiva/sclera: Conjunctivae normal       Pupils: Pupils are equal, round, and reactive to light  Cardiovascular:      Rate and Rhythm: Normal rate and regular rhythm  Heart sounds: Normal heart sounds  Pulmonary:      Effort: Pulmonary effort is normal       Breath sounds: No decreased breath sounds, wheezing, rhonchi or rales  Musculoskeletal:         General: No swelling or tenderness  Normal range of motion  Cervical back: Normal range of motion and neck supple  No spinous process tenderness or muscular tenderness  Right lower leg: No edema  Left lower leg: No edema  Skin:     General: Skin is warm and dry  Coloration: Skin is not jaundiced or pale  Findings: Abrasion (right parietal scalp) present  No rash  Neurological:      General: No focal deficit present  Mental Status: He is alert and oriented to person, place, and time  GCS: GCS eye subscore is 4  GCS verbal subscore is 5  GCS motor subscore is 6  Cranial Nerves: Cranial nerves are intact  No cranial nerve deficit, dysarthria or facial asymmetry  Sensory: Sensation is intact  Motor: Motor function is intact  No weakness or tremor  Coordination: Coordination is intact  Finger-Nose-Finger Test normal       Gait: Gait is intact  Psychiatric:         Mood and Affect: Mood normal          Speech: Speech normal          Behavior: Behavior is cooperative           Cognition and Memory: Cognition normal          Vital Signs  ED Triage Vitals   Temperature Pulse Respirations Blood Pressure SpO2   04/25/22 1145 04/25/22 1145 04/25/22 1145 04/25/22 1145 04/25/22 1145   98 5 °F (36 9 °C) 76 16 148/79 99 %      Temp Source Heart Rate Source Patient Position - Orthostatic VS BP Location FiO2 (%)   04/25/22 1258 04/25/22 1200 04/25/22 1145 04/25/22 1145 --   Temporal Monitor Lying Right arm       Pain Score 04/25/22 1146       4           Vitals:    04/25/22 1145 04/25/22 1200 04/25/22 1258   BP: 148/79 134/56 130/82   Pulse: 76 85    Patient Position - Orthostatic VS: Lying  Sitting         Visual Acuity  Visual Acuity      Most Recent Value   L Pupil Size (mm) 3   R Pupil Size (mm) 3          ED Medications  Medications   ketorolac (TORADOL) injection 15 mg (15 mg Intramuscular Given 4/25/22 1218)   ondansetron (ZOFRAN-ODT) dispersible tablet 4 mg (4 mg Oral Given 4/25/22 1219)       Diagnostic Studies  Results Reviewed     None                 CT head without contrast   Final Result by Odalis Salvador MD (04/25 1243)      No acute intracranial hemorrhage seen   No mass effect or midline shift seen                  Workstation performed: [id number]                    Procedures  Procedures         ED Course  ED Course as of 04/25/22 1429   Mon Apr 25, 2022   1252 Spoke with patient's mother over the phone per patient's request                                 SBIRT 20yo+      Most Recent Value   SBIRT (23 yo +)    In order to provide better care to our patients, we are screening all of our patients for alcohol and drug use  Would it be okay to ask you these screening questions? Yes Filed at: 04/25/2022 1145   Initial Alcohol Screen: US AUDIT-C     1  How often do you have a drink containing alcohol? 0 Filed at: 04/25/2022 1145   2  How many drinks containing alcohol do you have on a typical day you are drinking? 0 Filed at: 04/25/2022 1145   3a  Male UNDER 65: How often do you have five or more drinks on one occasion? 0 Filed at: 04/25/2022 1145   Audit-C Score 0 Filed at: 04/25/2022 1145   TENISHA: How many times in the past year have you    Used an illegal drug or used a prescription medication for non-medical reasons?  Never Filed at: 04/25/2022 1145                    MDM  Number of Diagnoses or Management Options  Head injury: new and requires workup  Post concussive syndrome: new and requires workup  Diagnosis management comments: Patient with head injury, dx with concussion a couple weeks ago, now with another injury with worsening headache, will order CT head to r/o hemorrhage  No abnormalities on CT scan, will refer to concussion clinic and prescribe medrol dose pack to help with headaches  Return precautions given  Amount and/or Complexity of Data Reviewed  Tests in the radiology section of CPT®: ordered and reviewed    Patient Progress  Patient progress: stable      Disposition  Final diagnoses:   Head injury   Post concussive syndrome     Time reflects when diagnosis was documented in both MDM as applicable and the Disposition within this note     Time User Action Codes Description Comment    4/25/2022 12:45 PM Juan Edwards Add [S09 90XA] Head injury     4/25/2022 12:45 PM Juan Stewartems Add [D75 17] Post concussive syndrome       ED Disposition     ED Disposition Condition Date/Time Comment    Discharge Stable Mon Apr 25, 2022 12:45  M  [facility] discharge to home/self care  Follow-up Information     Follow up With Specialties Details Why Contact Info    [employer]  Call  for family doctor in the area   [phone]            Discharge Medication List as of 4/25/2022 12:54 PM      START taking these medications    Details   methylprednisolone (MEDROL) 4 mg tablet Medrol dose pack:  Take as directed, Normal         CONTINUE these medications which have NOT CHANGED    Details   ibuprofen (MOTRIN) 600 mg tablet Take 1 tablet (600 mg total) by mouth every 6 (six) hours as needed for mild pain, Starting Sun 3/27/2022, Normal      meloxicam (MOBIC) 7 5 mg tablet Take 1 tablet (7 5 mg total) by mouth daily, Starting Fri 10/15/2021, Normal                 PDMP Review     None          ED Provider  Electronically Signed by           Benigno Alcantar PA-C  04/25/22 9238

## 2022-07-06 ENCOUNTER — APPOINTMENT (OUTPATIENT)
Dept: RADIOLOGY | Facility: CLINIC | Age: 22
End: 2022-07-06
Payer: COMMERCIAL

## 2022-07-06 ENCOUNTER — OFFICE VISIT (OUTPATIENT)
Dept: URGENT CARE | Facility: CLINIC | Age: 22
End: 2022-07-06
Payer: COMMERCIAL

## 2022-07-06 VITALS
TEMPERATURE: 97.4 F | OXYGEN SATURATION: 99 % | RESPIRATION RATE: 16 BRPM | HEART RATE: 62 BPM | SYSTOLIC BLOOD PRESSURE: 135 MMHG | WEIGHT: 266 LBS | DIASTOLIC BLOOD PRESSURE: 72 MMHG | BODY MASS INDEX: 35.09 KG/M2

## 2022-07-06 DIAGNOSIS — S93.402A SPRAIN OF LEFT ANKLE, UNSPECIFIED LIGAMENT, INITIAL ENCOUNTER: Primary | ICD-10-CM

## 2022-07-06 DIAGNOSIS — S93.402A SPRAIN OF LEFT ANKLE, UNSPECIFIED LIGAMENT, INITIAL ENCOUNTER: ICD-10-CM

## 2022-07-06 PROCEDURE — S9083 URGENT CARE CENTER GLOBAL: HCPCS

## 2022-07-06 PROCEDURE — 99213 OFFICE O/P EST LOW 20 MIN: CPT

## 2022-07-06 PROCEDURE — 73610 X-RAY EXAM OF ANKLE: CPT

## 2022-07-06 NOTE — PROGRESS NOTES
[id number]        NAME: Saurav Loza is a 24 y o  male  : 2000    MRN: [id number]  DATE: 2022  TIME: 12:45 PM    Assessment and Plan   Sprain of left ankle, unspecified ligament, initial encounter [S93 402A]  1  Sprain of left ankle, unspecified ligament, initial encounter  XR ankle 3+ vw left         Patient Instructions       Follow up with PCP in 3-5 days  Proceed to  ER if symptoms worsen  Chief Complaint     Chief Complaint   Patient presents with    Ankle Injury     This morning pt was running on gravel trail, rolled L ankle laterally  Pt reports pain intermittent pain 4/10, squeezing and sharp pain  Gentle palpation, weight bearing, flexion, extension, rotation  Prior fracture and sprain to same ankle  History of Present Illness       42-year-old male with ankle pain beginning this morning  He states that he twisted his ankle at the end of his 6 mi run  Pain is now located along the medial aspects of the ankle  Pain reproduced with any attempted weight-bearing  Swelling continues to persist   Denies any numbness or tingling  Review of Systems   Review of Systems   Constitutional: Negative  HENT: Negative  Eyes: Negative  Respiratory: Negative  Cardiovascular: Negative  Gastrointestinal: Negative  Genitourinary: Negative  Musculoskeletal: Positive for arthralgias and myalgias  Skin: Negative  Allergic/Immunologic: Negative  Neurological: Negative  Hematological: Negative  Psychiatric/Behavioral: Negative            Current Medications       Current Outpatient Medications:     ibuprofen (MOTRIN) 600 mg tablet, Take 1 tablet (600 mg total) by mouth every 6 (six) hours as needed for mild pain (Patient not taking: Reported on 2022), Disp: 30 tablet, Rfl: 0    meloxicam (MOBIC) 7 5 mg tablet, Take 1 tablet (7 5 mg total) by mouth daily (Patient not taking: No sig reported), Disp: 60 tablet, Rfl: 0    methylprednisolone (MEDROL) 4 mg tablet, Medrol dose pack:  Take as directed (Patient not taking: Reported on 7/6/2022), Disp: 21 tablet, Rfl: 0    Current Allergies     Allergies as of 07/06/2022    (No Known Allergies)            The following portions of the patient's history were reviewed and updated as appropriate: allergies, current medications, past family history, past medical history, past social history, past surgical history and problem list      Past Medical History:   Diagnosis Date    Asthma     childhood    Closed fracture of right foot     Closed left ankle fracture     Concussion        Past Surgical History:   Procedure Laterality Date    DENTAL SURGERY      MOUTH SURGERY         History reviewed  No pertinent family history  Medications have been verified  Objective   /72   Pulse 62   Temp (!) 97 4 °F (36 3 °C)   Resp 16   Wt 121 kg (266 lb)   SpO2 99%   BMI 35 09 kg/m²   No LMP for male patient  Physical Exam     Physical Exam  Constitutional:       Appearance: He is well-developed  HENT:      Head: Normocephalic  Eyes:      Pupils: Pupils are equal, round, and reactive to light  Cardiovascular:      Rate and Rhythm: Normal rate  Pulmonary:      Effort: Pulmonary effort is normal    Musculoskeletal:         General: Normal range of motion  Cervical back: Normal range of motion  Skin:     General: Skin is warm  Neurological:      Mental Status: He is alert and oriented to person, place, and time

## 2023-01-09 ENCOUNTER — HOSPITAL ENCOUNTER (EMERGENCY)
Facility: HOSPITAL | Age: 23
Discharge: HOME | End: 2023-01-10
Attending: EMERGENCY MEDICINE

## 2023-01-09 ENCOUNTER — APPOINTMENT (EMERGENCY)
Dept: ULTRASOUND IMAGING | Facility: HOSPITAL | Age: 23
End: 2023-01-09

## 2023-01-09 DIAGNOSIS — S93.492A SPRAIN OF ANTERIOR TALOFIBULAR LIGAMENT OF LEFT ANKLE, INITIAL ENCOUNTER: ICD-10-CM

## 2023-01-09 DIAGNOSIS — F07.81 POST CONCUSSIVE SYNDROME: ICD-10-CM

## 2023-01-09 DIAGNOSIS — K40.90 RIGHT INGUINAL HERNIA: Primary | ICD-10-CM

## 2023-01-09 LAB
ANION GAP SERPL CALCULATED.3IONS-SCNC: 4 MMOL/L (ref 4–13)
BACTERIA UR QL AUTO: ABNORMAL /HPF
BASOPHILS # BLD AUTO: 0.03 THOUSANDS/ÂΜL (ref 0–0.1)
BASOPHILS NFR BLD AUTO: 0 % (ref 0–1)
BILIRUB UR QL STRIP: NEGATIVE
BUN SERPL-MCNC: 14 MG/DL (ref 5–25)
CALCIUM SERPL-MCNC: 8.8 MG/DL (ref 8.3–10.1)
CHLORIDE SERPL-SCNC: 105 MMOL/L (ref 96–108)
CLARITY UR: CLEAR
CO2 SERPL-SCNC: 30 MMOL/L (ref 21–32)
COLOR UR: YELLOW
CREAT SERPL-MCNC: 0.97 MG/DL (ref 0.6–1.3)
EOSINOPHIL # BLD AUTO: 0.16 THOUSAND/ÂΜL (ref 0–0.61)
EOSINOPHIL NFR BLD AUTO: 1 % (ref 0–6)
ERYTHROCYTE [DISTWIDTH] IN BLOOD BY AUTOMATED COUNT: 12 % (ref 11.6–15.1)
GFR SERPL CREATININE-BSD FRML MDRD: 110 ML/MIN/1.73SQ M
GLUCOSE SERPL-MCNC: 113 MG/DL (ref 65–140)
GLUCOSE UR STRIP-MCNC: NEGATIVE MG/DL
HCT VFR BLD AUTO: 50.7 % (ref 36.5–49.3)
HGB BLD-MCNC: 17.1 G/DL (ref 12–17)
HGB UR QL STRIP.AUTO: NEGATIVE
IMM GRANULOCYTES # BLD AUTO: 0.05 THOUSAND/UL (ref 0–0.2)
IMM GRANULOCYTES NFR BLD AUTO: 0 % (ref 0–2)
KETONES UR STRIP-MCNC: NEGATIVE MG/DL
LEUKOCYTE ESTERASE UR QL STRIP: NEGATIVE
LYMPHOCYTES # BLD AUTO: 2.92 THOUSANDS/ÂΜL (ref 0.6–4.47)
LYMPHOCYTES NFR BLD AUTO: 25 % (ref 14–44)
MCH RBC QN AUTO: 31.4 PG (ref 26.8–34.3)
MCHC RBC AUTO-ENTMCNC: 33.7 G/DL (ref 31.4–37.4)
MCV RBC AUTO: 93 FL (ref 82–98)
MONOCYTES # BLD AUTO: 0.96 THOUSAND/ÂΜL (ref 0.17–1.22)
MONOCYTES NFR BLD AUTO: 8 % (ref 4–12)
MUCOUS THREADS UR QL AUTO: ABNORMAL
NEUTROPHILS # BLD AUTO: 7.62 THOUSANDS/ÂΜL (ref 1.85–7.62)
NEUTS SEG NFR BLD AUTO: 66 % (ref 43–75)
NITRITE UR QL STRIP: NEGATIVE
NON-SQ EPI CELLS URNS QL MICRO: ABNORMAL /HPF
NRBC BLD AUTO-RTO: 0 /100 WBCS
PH UR STRIP.AUTO: 6 [PH]
PLATELET # BLD AUTO: 283 THOUSANDS/UL (ref 149–390)
PMV BLD AUTO: 9.3 FL (ref 8.9–12.7)
POTASSIUM SERPL-SCNC: 3.6 MMOL/L (ref 3.5–5.3)
PROT UR STRIP-MCNC: ABNORMAL MG/DL
RBC # BLD AUTO: 5.44 MILLION/UL (ref 3.88–5.62)
RBC #/AREA URNS AUTO: ABNORMAL /HPF
SODIUM SERPL-SCNC: 139 MMOL/L (ref 135–147)
SP GR UR STRIP.AUTO: >=1.03 (ref 1–1.03)
UROBILINOGEN UR STRIP-ACNC: <2 MG/DL
WBC # BLD AUTO: 11.74 THOUSAND/UL (ref 4.31–10.16)
WBC #/AREA URNS AUTO: ABNORMAL /HPF

## 2023-01-09 RX ORDER — KETOROLAC TROMETHAMINE 30 MG/ML
15 INJECTION, SOLUTION INTRAMUSCULAR; INTRAVENOUS ONCE
Status: COMPLETED | OUTPATIENT
Start: 2023-01-09 | End: 2023-01-09

## 2023-01-09 RX ADMIN — KETOROLAC TROMETHAMINE 15 MG: 30 INJECTION, SOLUTION INTRAMUSCULAR; INTRAVENOUS at 22:17

## 2023-01-10 VITALS
DIASTOLIC BLOOD PRESSURE: 78 MMHG | RESPIRATION RATE: 18 BRPM | HEART RATE: 59 BPM | TEMPERATURE: 98.1 F | OXYGEN SATURATION: 100 % | SYSTOLIC BLOOD PRESSURE: 125 MMHG | BODY MASS INDEX: 31 KG/M2 | WEIGHT: 235 LBS

## 2023-01-10 NOTE — ED ATTENDING ATTESTATION
1/9/2023  IJean MD, saw and evaluated the patient  I have discussed the patient with the resident/non-physician practitioner and agree with the resident's/non-physician practitioner's findings, Plan of Care, and MDM as documented in the resident's/non-physician practitioner's note, except where noted  All available labs and Radiology studies were reviewed  I was present for key portions of any procedure(s) performed by the resident/non-physician practitioner and I was immediately available to provide assistance  At this point I agree with the current assessment done in the Emergency Department  I have conducted an independent evaluation of this patient a history and physical is as follows:    ED Course  ED Course as of 01/10/23 0056   Tue Devan 10, 2023   0054 U/s noted  No torsion  Pt with no pain currently  Abd soft and nontender  Suspect non-incarcerated hernia  Will d/c home  Discussed supportive care, pcp f/u, and surg f/u  Pt agrees with plan  RTED if sx worsen            Critical Care Time  Procedures
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
## 2023-01-10 NOTE — ED NOTES
Pt had questions regarding insurance and co-payment information and was referred to registration for more information       Facundo Kelley, MEGHAN  01/09/23 6807

## 2023-01-12 RX ORDER — METHYLPREDNISOLONE 4 MG/1
TABLET ORAL
Qty: 21 TABLET | Refills: 0 | Status: SHIPPED | OUTPATIENT
Start: 2023-01-12

## 2023-01-12 RX ORDER — MELOXICAM 7.5 MG/1
7.5 TABLET ORAL DAILY
Qty: 20 TABLET | Refills: 0 | Status: SHIPPED | OUTPATIENT
Start: 2023-01-12

## 2023-01-23 ENCOUNTER — CONSULT (OUTPATIENT)
Dept: SURGERY | Facility: HOSPITAL | Age: 23
End: 2023-01-23

## 2023-01-23 VITALS
HEIGHT: 73 IN | WEIGHT: 242.2 LBS | HEART RATE: 82 BPM | SYSTOLIC BLOOD PRESSURE: 114 MMHG | RESPIRATION RATE: 16 BRPM | DIASTOLIC BLOOD PRESSURE: 76 MMHG | TEMPERATURE: 97.7 F | BODY MASS INDEX: 32.1 KG/M2

## 2023-01-23 DIAGNOSIS — K40.20 BILATERAL INGUINAL HERNIA WITHOUT OBSTRUCTION OR GANGRENE, RECURRENCE NOT SPECIFIED: Primary | ICD-10-CM

## 2023-01-25 NOTE — PROGRESS NOTES
Assessment/Plan:   Maribeth Higuera is a 25 y o male who is here for Testicle Pain (New patient evaluated at [address] [place] [address] ER on 1/9/23 for c/o right testicle pain that radiates down into right lower abdomen  Comes in today holding his right abdomen  States he is having pain that can be anywhere from a "4" to an "8"  US of scrotum done in ER  Questionable right inguinal hernia )    Plan: Bilateral inguinal hernia - discussed operative vs conservative mgt, surgical approaches, risks and benefits and patient will consider his options and timing  I will schedule at their earliest convenience  Preoperative Clearance: None    HPI:  Maribeth Higuera is a 25 y o male who was referred for evaluation of Testicle Pain (New patient evaluated at [address] [facility] ER on 1/9/23 for c/o right testicle pain that radiates down into right lower abdomen  Comes in today holding his right abdomen  States he is having pain that can be anywhere from a "4" to an "8"  US of scrotum done in ER  Questionable right inguinal hernia )    Currently groin pain  ROS:  General ROS: negative  negative for - chills, fatigue, fever or night sweats, weight loss  Respiratory ROS: no cough, shortness of breath, or wheezing  Cardiovascular ROS: no chest pain or dyspnea on exertion  Genito-Urinary ROS: no dysuria, trouble voiding, or hematuria  Musculoskeletal ROS: negative for - gait disturbance, joint pain or muscle pain  Neurological ROS: no TIA or stroke symptoms  abdominal pain    [unfilled]  Patient has no known allergies      Current Outpatient Medications:   •  meloxicam (MOBIC) 7 5 mg tablet, Take 1 tablet (7 5 mg total) by mouth daily, Disp: 20 tablet, Rfl: 0  •  methylprednisolone (MEDROL) 4 mg tablet, Medrol dose pack:  Take as directed, Disp: 21 tablet, Rfl: 0  Past Medical History:   Diagnosis Date   • Asthma     childhood   • Closed fracture of right foot    • Closed left ankle fracture    • Concussion      Past Surgical History:   Procedure Laterality Date   • DENTAL SURGERY     • MOUTH SURGERY       No family history on file  reports that he has never smoked  He has never used smokeless tobacco  He reports current alcohol use  He reports that he does not use drugs  Labs:   Lab Results   Component Value Date    WBC 11 74 (H) 01/09/2023    HGB 17 1 (H) 01/09/2023     01/09/2023     No results found for: ALT, AST  This SmartLink has not been configured with any valid records  PHYSICAL EXAM  General Appearance:    Alert, cooperative, no distress,    Head:    Normocephalic without obvious abnormality   Eyes:    PERRL, conjunctiva/corneas clear, EOM's intact        Neck:   Supple, no adenopathy, no JVD   Back:     Symmetric, no spinal or CVA tenderness   Lungs:     Clear to auscultation bilaterally, no wheezing or rhonchi   Heart:    Regular rate and rhythm, S1 and S2 normal, no murmur   Abdomen:     Soft +BS ND NT +b/l ing hernia   Extremities:   Extremities normal  No clubbing, cyanosis or edema   Psych:   Normal Affect, AOx3  Neurologic:  Skin:   CNII-XII intact  Strength symmetric, speech intact    Warm, dry, intact, no visible rashes or lesions         Physical Exam              Some portions of this record may have been generated with voice recognition software  There may be translation, syntax,  or grammatical errors  Occasional wrong word or "sound-a-like" substitutions may have occurred due to the inherent limitations of the voice recognition software  Read the chart carefully and recognize, using context, where substitutions may have occurred  If you have any questions, please contact the dictating provider for clarification or correction, as needed  This encounter has been coded by a non-certified coder